# Patient Record
Sex: FEMALE | Race: ASIAN | URBAN - METROPOLITAN AREA
[De-identification: names, ages, dates, MRNs, and addresses within clinical notes are randomized per-mention and may not be internally consistent; named-entity substitution may affect disease eponyms.]

---

## 2017-10-09 ENCOUNTER — APPOINTMENT (RX ONLY)
Dept: URBAN - METROPOLITAN AREA CLINIC 142 | Facility: CLINIC | Age: 27
Setting detail: DERMATOLOGY
End: 2017-10-09

## 2017-10-09 DIAGNOSIS — K13.0 DISEASES OF LIPS: ICD-10-CM

## 2017-10-09 DIAGNOSIS — H01.13 ECZEMATOUS DERMATITIS OF EYELID: ICD-10-CM

## 2017-10-09 PROBLEM — L70.0 ACNE VULGARIS: Status: ACTIVE | Noted: 2017-10-09

## 2017-10-09 PROBLEM — H01.133 ECZEMATOUS DERMATITIS OF RIGHT EYE, UNSPECIFIED EYELID: Status: ACTIVE | Noted: 2017-10-09

## 2017-10-09 PROBLEM — H01.136 ECZEMATOUS DERMATITIS OF LEFT EYE, UNSPECIFIED EYELID: Status: ACTIVE | Noted: 2017-10-09

## 2017-10-09 PROCEDURE — 99202 OFFICE O/P NEW SF 15 MIN: CPT

## 2017-10-09 PROCEDURE — ? TREATMENT REGIMEN

## 2017-10-09 PROCEDURE — ? PRESCRIPTION

## 2017-10-09 PROCEDURE — ? COUNSELING

## 2017-10-09 RX ORDER — DESONIDE 0.5 MG/G
OINTMENT TOPICAL BID
Qty: 15 | Refills: 2 | Status: ERX

## 2017-10-09 RX ORDER — DESONIDE 0.5 MG/G
OINTMENT TOPICAL BID
Qty: 15 | Refills: 2 | Status: ERX | COMMUNITY
Start: 2017-10-09

## 2017-10-09 RX ADMIN — DESONIDE: 0.5 OINTMENT TOPICAL at 18:35

## 2017-10-09 ASSESSMENT — LOCATION DETAILED DESCRIPTION DERM
LOCATION DETAILED: LEFT MEDIAL CANTHUS
LOCATION DETAILED: LEFT ORAL COMMISSURE
LOCATION DETAILED: RIGHT MEDIAL CANTHUS
LOCATION DETAILED: RIGHT ORAL COMMISSURE

## 2017-10-09 ASSESSMENT — LOCATION SIMPLE DESCRIPTION DERM
LOCATION SIMPLE: RIGHT EYELID
LOCATION SIMPLE: LEFT LIP
LOCATION SIMPLE: LEFT EYELID
LOCATION SIMPLE: RIGHT LIP

## 2017-10-09 ASSESSMENT — LOCATION ZONE DERM
LOCATION ZONE: LIP
LOCATION ZONE: EYELID

## 2017-10-09 NOTE — PROCEDURE: TREATMENT REGIMEN
Detail Level: Zone
Otc Regimen: CeraVe hydrating cleanser and moisturizer.\\n\\nAquaphor at bedtime
Otc Regimen: aquaphor
Otc Regimen: cerave